# Patient Record
Sex: MALE | Race: WHITE | NOT HISPANIC OR LATINO | Employment: UNEMPLOYED | URBAN - METROPOLITAN AREA
[De-identification: names, ages, dates, MRNs, and addresses within clinical notes are randomized per-mention and may not be internally consistent; named-entity substitution may affect disease eponyms.]

---

## 2020-06-19 ENCOUNTER — NURSE TRIAGE (OUTPATIENT)
Dept: ADMINISTRATIVE | Facility: CLINIC | Age: 25
End: 2020-06-19
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
## 2020-10-18 PROBLEM — S09.90XA CLOSED HEAD INJURY: Status: ACTIVE | Noted: 2020-10-18

## 2020-10-19 PROBLEM — E87.6 HYPOKALEMIA: Status: ACTIVE | Noted: 2020-10-19

## 2022-03-24 PROBLEM — S27.321A CONTUSION OF RIGHT LUNG: Status: ACTIVE | Noted: 2021-12-24

## 2022-03-24 PROBLEM — S62.101D: Status: ACTIVE | Noted: 2018-04-03

## 2022-03-24 PROBLEM — M25.531 RIGHT WRIST PAIN: Status: ACTIVE | Noted: 2018-02-27

## 2022-03-24 PROBLEM — R45.4 ANGER REACTION: Status: ACTIVE | Noted: 2017-03-13

## 2022-03-24 PROBLEM — T14.90XA BLUNT TRAUMA: Status: ACTIVE | Noted: 2021-12-24

## 2022-03-24 PROBLEM — S22.32XA CLOSED FRACTURE OF ONE RIB OF LEFT SIDE: Status: ACTIVE | Noted: 2021-12-24

## 2022-03-24 PROBLEM — F19.11: Status: ACTIVE | Noted: 2018-05-17

## 2023-11-26 ENCOUNTER — ON-DEMAND VIRTUAL (OUTPATIENT)
Dept: URGENT CARE | Facility: CLINIC | Age: 28
End: 2023-11-26
Payer: MEDICAID

## 2023-11-26 DIAGNOSIS — J02.9 PHARYNGITIS, UNSPECIFIED ETIOLOGY: Primary | ICD-10-CM

## 2023-11-26 PROCEDURE — 99213 PR OFFICE/OUTPT VISIT, EST, LEVL III, 20-29 MIN: ICD-10-PCS | Mod: 95,,, | Performed by: PHYSICIAN ASSISTANT

## 2023-11-26 PROCEDURE — 99213 OFFICE O/P EST LOW 20 MIN: CPT | Mod: 95,,, | Performed by: PHYSICIAN ASSISTANT

## 2023-11-26 RX ORDER — IBUPROFEN 400 MG/1
400 TABLET ORAL EVERY 6 HOURS PRN
Qty: 20 TABLET | Refills: 0 | Status: SHIPPED | OUTPATIENT
Start: 2023-11-26

## 2023-11-26 RX ORDER — AMOXICILLIN 500 MG/1
500 CAPSULE ORAL EVERY 12 HOURS
Qty: 20 CAPSULE | Refills: 0 | Status: SHIPPED | OUTPATIENT
Start: 2023-11-26 | End: 2023-11-29

## 2023-11-27 NOTE — PROGRESS NOTES
Subjective:      Patient ID: Eulalio Gilliam is a 28 y.o. male.    Vitals:  vitals were not taken for this visit.     Chief Complaint: Sore Throat      Visit Type: TELE AUDIOVISUAL    Present with the patient at the time of consultation: TELEMED PRESENT WITH PATIENT: None    History reviewed. No pertinent past medical history.  Past Surgical History:   Procedure Laterality Date    nasal septum repair      RHINOPLASTY       Review of patient's allergies indicates:  No Known Allergies  Current Outpatient Medications on File Prior to Visit   Medication Sig Dispense Refill    cetirizine (ZYRTEC) 10 MG tablet Take 1 tablet (10 mg total) by mouth once daily. 30 tablet 0     No current facility-administered medications on file prior to visit.     History reviewed. No pertinent family history.    Medications Ordered                [facility] [address] [place] [id number]    Telephone: [phone]   Fax: [phone]   Hours: Not open 24 hours                         E-Prescribed (2 of 2)              amoxicillin (AMOXIL) 500 MG capsule    Sig: Take 1 capsule (500 mg total) by mouth every 12 (twelve) hours. for 10 days       Start: 11/26/23     Quantity: 20 capsule Refills: 0                         ibuprofen (ADVIL,MOTRIN) 400 MG tablet    Sig: Take 1 tablet (400 mg total) by mouth every 6 (six) hours as needed for Other (pain or fever).       Start: 11/26/23     Quantity: 20 tablet Refills: 0                           Ohs Peq Odvv Intake    11/26/2023  7:03 PM CST - Filed by Patient   Describe your reason for todays visit I have a constant cough and extremely bad sore thoat   What is your current physical address in the event of a medical emergency? [address] [facility]   Are you able to take your vital signs? No   Please attach any relevant images or files          HPI  29yo male presents with c/o headache, sore throat, neck lymph node swelling x 2 days. Also notes  cough from painful throat. Denies fevers, runny nose, trouble swallowing, rash, GI symptoms. Taking acetaminophen, zyrtec without improvement.           Constitution: Positive for appetite change. Negative for chills and fever.   HENT:  Positive for sore throat. Negative for ear pain, congestion and trouble swallowing.    Respiratory:  Positive for cough (states from throat, not chest). Negative for shortness of breath.    Gastrointestinal:  Negative for abdominal pain, nausea, vomiting and diarrhea.   Skin:  Negative for rash.        Objective:   The physical exam was conducted virtually.  Physical Exam   Constitutional: He is oriented to person, place, and time.  Non-toxic appearance. He does not appear ill. No distress.   HENT:   Head: Normocephalic and atraumatic.   Mouth/Throat: Uvula is midline and mucous membranes are normal. Mucous membranes are moist. No trismus in the jaw. No uvula swelling. Posterior oropharyngeal erythema present. No oropharyngeal exudate or posterior oropharyngeal edema.   Eyes: Conjunctivae are normal.   Neck: Neck supple.   Pulmonary/Chest: Effort normal. No respiratory distress.   Abdominal: Normal appearance.   Lymphadenopathy:     He has cervical adenopathy.   Neurological: He is alert and oriented to person, place, and time. Coordination normal.   Skin: Skin is dry, not diaphoretic and no rash.   Psychiatric: His behavior is normal. Judgment and thought content normal.       Assessment:     1. Pharyngitis, unspecified etiology        Plan:       Pharyngitis, unspecified etiology    Other orders  -     amoxicillin (AMOXIL) 500 MG capsule; Take 1 capsule (500 mg total) by mouth every 12 (twelve) hours. for 10 days  Dispense: 20 capsule; Refill: 0  -     ibuprofen (ADVIL,MOTRIN) 400 MG tablet; Take 1 tablet (400 mg total) by mouth every 6 (six) hours as needed for Other (pain or fever).  Dispense: 20 tablet; Refill: 0      1. Push fluids and rest. Recommend warm salt gargles or tea  with honey for throat discomfort. May take Tylenol or Ibuprofen for fever or pain control. Antibiotic has been sent to your pharmacy, please take as directed.  2. If no improvement in 2-3 days please follow up at local urgent care for further evaluation or sooner if worsening pain, trouble or difficulty swallowing, unable to open mouth or other concerns.  3.  You must understand that you've received a Telehealth Urgent Care treatment only and that you may be released before all your medical problems are known or treated. You, the patient, will arrange for follow up care as instructed.    Patient voiced understanding and agrees to plan.

## 2023-11-27 NOTE — PATIENT INSTRUCTIONS
1. Push fluids and rest. Recommend warm salt gargles or tea with honey for throat discomfort. May take Tylenol or Ibuprofen for fever or pain control. Antibiotic has been sent to your pharmacy, please take as directed.  2. If no improvement in 2-3 days please follow up at local urgent care for further evaluation or sooner if worsening pain, trouble or difficulty swallowing, unable to open mouth or other concerns.  3.  You must understand that you've received a Telehealth Urgent Care treatment only and that you may be released before all your medical problems are known or treated. You, the patient, will arrange for follow up care as instructed.

## 2023-11-29 ENCOUNTER — ON-DEMAND VIRTUAL (OUTPATIENT)
Dept: URGENT CARE | Facility: CLINIC | Age: 28
End: 2023-11-29
Payer: MEDICAID

## 2023-11-29 DIAGNOSIS — B96.89 BACTERIAL UPPER RESPIRATORY INFECTION: Primary | ICD-10-CM

## 2023-11-29 DIAGNOSIS — J06.9 BACTERIAL UPPER RESPIRATORY INFECTION: Primary | ICD-10-CM

## 2023-11-29 DIAGNOSIS — M25.522 LEFT ELBOW PAIN: ICD-10-CM

## 2023-11-29 PROCEDURE — 99213 PR OFFICE/OUTPT VISIT, EST, LEVL III, 20-29 MIN: ICD-10-PCS | Mod: 95,S$GLB,,

## 2023-11-29 PROCEDURE — 99213 OFFICE O/P EST LOW 20 MIN: CPT | Mod: 95,S$GLB,,

## 2023-11-29 RX ORDER — AMOXICILLIN 875 MG/1
875 TABLET, FILM COATED ORAL EVERY 12 HOURS
Qty: 20 TABLET | Refills: 0 | Status: SHIPPED | OUTPATIENT
Start: 2023-11-29 | End: 2023-12-09

## 2023-11-29 RX ORDER — PREDNISONE 10 MG/1
TABLET ORAL
Qty: 8 TABLET | Refills: 0 | Status: SHIPPED | OUTPATIENT
Start: 2023-11-29

## 2023-11-29 NOTE — PROGRESS NOTES
Subjective:      Patient ID: Eulalio Gilliam is a 28 y.o. male.    Vitals:  vitals were not taken for this visit.     Chief Complaint: Sinus Problem      Visit Type: TELE AUDIOVISUAL    Present with the patient at the time of consultation: TELEMED PRESENT WITH PATIENT: None    History reviewed. No pertinent past medical history.  Past Surgical History:   Procedure Laterality Date    nasal septum repair      RHINOPLASTY       Review of patient's allergies indicates:  No Known Allergies  Current Outpatient Medications on File Prior to Visit   Medication Sig Dispense Refill    ibuprofen (ADVIL,MOTRIN) 400 MG tablet Take 1 tablet (400 mg total) by mouth every 6 (six) hours as needed for Other (pain or fever). 20 tablet 0    [DISCONTINUED] amoxicillin (AMOXIL) 500 MG capsule Take 1 capsule (500 mg total) by mouth every 12 (twelve) hours. for 10 days 20 capsule 0     No current facility-administered medications on file prior to visit.     History reviewed. No pertinent family history.    Medications Ordered                [id number] - [place]  & [address], [place] [address]    Telephone: [phone]   Fax: [phone]   Hours: Not open 24 hours                         E-Prescribed (2 of 2)              amoxicillin (AMOXIL) 875 MG tablet    Sig: Take 1 tablet (875 mg total) by mouth every 12 (twelve) hours. for 10 days       Start: 11/29/23     Quantity: 20 tablet Refills: 0                         predniSONE (DELTASONE) 10 MG tablet    Sig: Take 20 mg on day one then take 10 mg on day 2-7       Start: 11/29/23     Quantity: 8 tablet Refills: 0                           Ohs Peq Odvv Intake    11/29/2023  5:15 PM CST - Filed by Patient   Describe your reason for todays visit I have 2 concerns that i need to address 1is my elbow has been very pAinful and its been becomming stiff and my 2nd concern is my through is very soar and my  voice is scratchy and i have been coughing up flem i have alot of pressure behind my eyes   What is your current physical address in the event of a medical emergency? [address]   Are you able to take your vital signs? No   Please attach any relevant images or files          Patient states that he was seen three days ago and given antibiotics for a sore throat with nasal drainage (green) and a productive cough (green). Patients states that he did not  his antibiotics due to having the wrong pharmacy. Patient states that for the past 2 weeks he has been having intermittent pain in his right arm which goes from his wrist to his elbow. Patient states that he did fracture his wrist in the past and it did not heal correctly. Patient states that his elbow feels stiff. Patient denies any known trauma or known cause.         Constitution: Negative.   HENT:  Positive for congestion and postnasal drip.    Neck: neck negative.   Cardiovascular: Negative.    Eyes: Negative.    Respiratory:  Positive for cough.    Gastrointestinal: Negative.    Endocrine: negative.   Genitourinary: Negative.    Musculoskeletal:  Positive for joint pain.   Skin: Negative.    Allergic/Immunologic: Negative.    Neurological: Negative.    Hematologic/Lymphatic: Negative.    Psychiatric/Behavioral: Negative.          Objective:   The physical exam was conducted virtually.  Physical Exam   Constitutional: He is oriented to person, place, and time.   HENT:   Head: Normocephalic and atraumatic.   Nose: Rhinorrhea and congestion present.   Eyes: Conjunctivae are normal. Pupils are equal, round, and reactive to light. Extraocular movement intact   Neck: Neck supple.   Pulmonary/Chest: Effort normal.   Abdominal: Normal appearance.   Musculoskeletal:         General: Tenderness present.   Neurological: no focal deficit. He is alert, oriented to person, place, and time and at baseline.   Psychiatric: His behavior is normal. Mood and thought  content normal.       Assessment:     1. Bacterial upper respiratory infection    2. Left elbow pain        Plan:       Bacterial upper respiratory infection  -     amoxicillin (AMOXIL) 875 MG tablet; Take 1 tablet (875 mg total) by mouth every 12 (twelve) hours. for 10 days  Dispense: 20 tablet; Refill: 0  -     predniSONE (DELTASONE) 10 MG tablet; Take 20 mg on day one then take 10 mg on day 2-7  Dispense: 8 tablet; Refill: 0    Left elbow pain  -     amoxicillin (AMOXIL) 875 MG tablet; Take 1 tablet (875 mg total) by mouth every 12 (twelve) hours. for 10 days  Dispense: 20 tablet; Refill: 0  -     predniSONE (DELTASONE) 10 MG tablet; Take 20 mg on day one then take 10 mg on day 2-7  Dispense: 8 tablet; Refill: 0

## 2024-02-02 ENCOUNTER — ON-DEMAND VIRTUAL (OUTPATIENT)
Dept: URGENT CARE | Facility: CLINIC | Age: 29
End: 2024-02-02

## 2024-02-02 ENCOUNTER — ON-DEMAND VIRTUAL (OUTPATIENT)
Dept: URGENT CARE | Facility: CLINIC | Age: 29
End: 2024-02-02
Payer: MEDICAID

## 2024-11-16 ENCOUNTER — ON-DEMAND VIRTUAL (OUTPATIENT)
Dept: URGENT CARE | Facility: CLINIC | Age: 29
End: 2024-11-16
Payer: MEDICAID

## 2024-11-16 DIAGNOSIS — R09.82 PND (POST-NASAL DRIP): Primary | ICD-10-CM

## 2024-11-16 PROCEDURE — 99213 OFFICE O/P EST LOW 20 MIN: CPT | Mod: 95,,, | Performed by: NURSE PRACTITIONER

## 2024-11-16 RX ORDER — AZELASTINE 1 MG/ML
1 SPRAY, METERED NASAL 2 TIMES DAILY
Qty: 30 ML | Refills: 0 | Status: SHIPPED | OUTPATIENT
Start: 2024-11-16

## 2024-11-16 RX ORDER — PREDNISONE 20 MG/1
20 TABLET ORAL DAILY
Qty: 3 TABLET | Refills: 0 | Status: SHIPPED | OUTPATIENT
Start: 2024-11-16 | End: 2024-11-19

## 2024-11-16 RX ORDER — ALBUTEROL SULFATE 90 UG/1
2 AEROSOL, METERED RESPIRATORY (INHALATION) EVERY 4 HOURS PRN
Qty: 18 G | Refills: 0 | Status: SHIPPED | OUTPATIENT
Start: 2024-11-16 | End: 2025-11-16

## 2024-11-16 NOTE — PROGRESS NOTES
Subjective:      Patient ID: Eulalio Gilliam is a 29 y.o. male.    Vitals:  vitals were not taken for this visit.     Chief Complaint: Sore Throat (cough)      Visit Type: TELE AUDIOVISUAL - This visit was conducted virtually based on  subjective information and limited objective exam    Present with the patient at the time of consultation: TELEMED PRESENT WITH PATIENT: None  Two patient identifiers used to verify patient- saying out date of birth and full name.       History reviewed. No pertinent past medical history.  Past Surgical History:   Procedure Laterality Date    nasal septum repair      RHINOPLASTY       Review of patient's allergies indicates:  No Known Allergies  Current Outpatient Medications on File Prior to Visit   Medication Sig Dispense Refill    cloNIDine (CATAPRES) 0.1 MG tablet Take 0.1 mg by mouth 2 (two) times daily.      diclofenac (VOLTAREN) 75 MG EC tablet Take 1 tablet (75 mg total) by mouth 2 (two) times daily. for pain and inflammation 60 tablet 2    ibuprofen (ADVIL,MOTRIN) 400 MG tablet Take 1 tablet (400 mg total) by mouth every 6 (six) hours as needed for Other (pain or fever). 20 tablet 0    predniSONE (DELTASONE) 10 MG tablet Take 20 mg on day one then take 10 mg on day 2-7 8 tablet 0     No current facility-administered medications on file prior to visit.     No family history on file.    Medications Ordered                [id number] - [place] [address] AT [facility]  & [place] [address]   [address], [place] [address]    Telephone: [phone]   Fax: [phone]   Hours: Not open 24 hours                         E-Prescribed (3 of 3)              albuterol (VENTOLIN HFA) 90 mcg/actuation inhaler    Sig: Inhale 2 puffs into the lungs every 4 (four) hours as needed for Wheezing. Rescue       Start: 24     Quantity: 18 g Refills: 0                         azelastine (ASTELIN) 137 mcg (0.1 %) nasal spray    Si spray (137 mcg  total) by Nasal route 2 (two) times daily.       Start: 11/16/24     Quantity: 30 mL Refills: 0                         predniSONE (DELTASONE) 20 MG tablet    Sig: Take 1 tablet (20 mg total) by mouth once daily. for 3 days       Start: 11/16/24     Quantity: 3 tablet Refills: 0                           Ohs Peq Odvv Intake    11/16/2024  9:55 AM CST - Filed by Patient   What is your current physical address in the event of a medical emergency? [address]   Are you able to take your vital signs? No   Please attach any relevant images or files    Is your employer contracted with [facility]? No         28 yo male with c/o sore throat, dry cough and some sinus drainage. No fever. He denies chest congestion, sob and wheezing. He has tried tylenol without relief. He also is requesting refill on albuterol.         Constitution: Negative.   HENT:  Positive for sore throat.    Cardiovascular: Negative.    Eyes: Negative.    Respiratory:  Positive for cough.    Gastrointestinal: Negative.  Negative for bowel incontinence.   Endocrine: negative.   Genitourinary: Negative.  Negative for dysuria, flank pain, bladder incontinence and pelvic pain.   Musculoskeletal: Negative.  Negative for pain, abnormal ROM of joint and back pain.   Skin: Negative.    Allergic/Immunologic: Negative.    Neurological: Negative.    Hematologic/Lymphatic: Negative.    Psychiatric/Behavioral: Negative.          Objective:   The physical exam was conducted virtually.  LOCATION OF PATIENT [place]  AAO x 3 ; no acute distress noted; appearance normal; mood and behavior normal; thought process normal  Head- normocephalic  Nose- appears normal, no discharge or erythema  Eyes- pupils appear normal in size, no drainage, no erythema  Ears- normal appearing; no discharge, no erythema  Mouth- appears normal  Oropharynx- no erythema, lesions  Lungs- breathing at a normal rate, no acute distress noted  Heart- no reports of tachycardia,  palpitations, chest pain  Abdomen- non distended, non tender reported by patient  Skin- warm and dry, no erythema or edema noted by patient or visualized  Psych- as above; no si/hi      Assessment:     1. PND (post-nasal drip)        Plan:         Thank you for choosing [facility]!    Our goal in the [facility] is to always provide outstanding medical care. You may receive a survey by mail or e-mail in the next week regarding your experience today. We would greatly appreciate you completing and returning the survey. Your feedback provides us with a way to recognize our staff who provide very good care, and it helps us learn how to improve when your experience was below our aspiration of excellence.         We appreciate you trusting us with your medical care. We hope you feel better soon. We will be happy to take care of you for all of your future medical needs.    You must understand that you've received an Urgent Care treatment only and that you may be released before all your medical problems are known or treated. You, the patient, will arrange for follow up care as instructed.    Follow up with your PCP or specialty clinic as directed in the next 1-2 weeks if not improved or as needed.  You can call [phone] to schedule an appointment with the appropriate provider.    If your condition worsens we recommend that you receive another evaluation in person, with your primary care provider, urgent care or at the emergency room immediately or contact your primary medical clinics after hours call service to discuss your concerns.         PND (post-nasal drip)  -     azelastine (ASTELIN) 137 mcg (0.1 %) nasal spray; 1 spray (137 mcg total) by Nasal route 2 (two) times daily.  Dispense: 30 mL; Refill: 0  -     predniSONE (DELTASONE) 20 MG tablet; Take 1 tablet (20 mg total) by mouth once daily. for 3 days  Dispense: 3 tablet; Refill: 0  -     albuterol (VENTOLIN HFA) 90  mcg/actuation inhaler; Inhale 2 puffs into the lungs every 4 (four) hours as needed for Wheezing. Rescue  Dispense: 18 g; Refill: 0